# Patient Record
Sex: MALE | Race: ASIAN | NOT HISPANIC OR LATINO | URBAN - METROPOLITAN AREA
[De-identification: names, ages, dates, MRNs, and addresses within clinical notes are randomized per-mention and may not be internally consistent; named-entity substitution may affect disease eponyms.]

---
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
## 2023-01-01 NOTE — ED PROVIDER NOTE - MEDICAL DECISION MAKING DETAILS
19 y/o male c/o CP past week, no difficulty breathing or palpitations, reproducible CP. Plan EKG gave po Motrin. Dx costochondritis d/c home with instructions f/u w/ PMD or cardiology is CP persist
Vaginal Delivery

## 2023-11-01 ENCOUNTER — EMERGENCY (EMERGENCY)
Facility: HOSPITAL | Age: 23
LOS: 1 days | Discharge: HOME | End: 2023-11-01
Attending: EMERGENCY MEDICINE | Admitting: EMERGENCY MEDICINE
Payer: MEDICAID

## 2023-11-01 VITALS
HEART RATE: 90 BPM | OXYGEN SATURATION: 100 % | SYSTOLIC BLOOD PRESSURE: 153 MMHG | TEMPERATURE: 99 F | DIASTOLIC BLOOD PRESSURE: 101 MMHG | RESPIRATION RATE: 18 BRPM

## 2023-11-01 VITALS
TEMPERATURE: 99 F | DIASTOLIC BLOOD PRESSURE: 89 MMHG | RESPIRATION RATE: 18 BRPM | SYSTOLIC BLOOD PRESSURE: 130 MMHG | HEART RATE: 83 BPM | OXYGEN SATURATION: 99 %

## 2023-11-01 LAB
FLUAV AG NPH QL: SIGNIFICANT CHANGE UP
FLUAV AG NPH QL: SIGNIFICANT CHANGE UP
FLUBV AG NPH QL: SIGNIFICANT CHANGE UP
FLUBV AG NPH QL: SIGNIFICANT CHANGE UP
RSV RNA NPH QL NAA+NON-PROBE: SIGNIFICANT CHANGE UP
RSV RNA NPH QL NAA+NON-PROBE: SIGNIFICANT CHANGE UP
SARS-COV-2 RNA SPEC QL NAA+PROBE: SIGNIFICANT CHANGE UP
SARS-COV-2 RNA SPEC QL NAA+PROBE: SIGNIFICANT CHANGE UP

## 2023-11-01 PROCEDURE — 71046 X-RAY EXAM CHEST 2 VIEWS: CPT | Mod: 26

## 2023-11-01 PROCEDURE — 93010 ELECTROCARDIOGRAM REPORT: CPT

## 2023-11-01 PROCEDURE — 99284 EMERGENCY DEPT VISIT MOD MDM: CPT

## 2023-11-01 RX ORDER — IBUPROFEN 200 MG
600 TABLET ORAL ONCE
Refills: 0 | Status: COMPLETED | OUTPATIENT
Start: 2023-11-01 | End: 2023-11-01

## 2023-11-01 RX ADMIN — Medication 600 MILLIGRAM(S): at 21:02

## 2023-11-01 NOTE — ED PROVIDER NOTE - PATIENT PORTAL LINK FT
You can access the FollowMyHealth Patient Portal offered by [facility] by registering at the following website: [email or web address]. By joining [facility]’s FollowMyHealth portal, you will also be able to view your health information using other applications (apps) compatible with our system.

## 2023-11-01 NOTE — ED ADULT NURSE NOTE - NSFALLUNIVINTERV_ED_ALL_ED
Bed/Stretcher in lowest position, wheels locked, appropriate side rails in place/Call bell, personal items and telephone in reach/Instruct patient to call for assistance before getting out of bed/chair/stretcher/Non-slip footwear applied when patient is off stretcher/[place] to call system/Physically safe environment - no spills, clutter or unnecessary equipment/Purposeful proactive rounding/Room/bathroom lighting operational, light cord in reach

## 2023-11-01 NOTE — ED PROVIDER NOTE - PROGRESS NOTE DETAILS
Daniel Moreno, DO PGY-3: Chest x-ray normal, EKG normal, patient feeling better after ibuprofen.  Will discharge patient to follow-up with PMD.

## 2023-11-01 NOTE — ED ADULT NURSE NOTE - OBJECTIVE STATEMENT
as per triage rn, "pt complaining of chest pain that radiates down his R arm x 3 days. Pt denies pmhx. Pt hypertensive in triage. Pt denies sob, n/v/d, fever or chills."

## 2023-11-01 NOTE — ED PROVIDER NOTE - OBJECTIVE STATEMENT
23-year-old male no past medical history presents to ED complaining of 1 week of diffuse chest pain as well as bilateral trapezius pain that is worsened with movement of bilateral arms and position as well as coughing.  Patient's had a viral URI since beginning of symptoms.  Patient last took pain medication yesterday without relief.  Patient denies family history of early cardiac disease, blood clotting history, recent travel, fever, nausea, vomiting, diaphoresis, shortness of breath, abdominal pain, dysuria, diarrhea.  Patient is an active tobacco smoker.

## 2023-11-01 NOTE — ED PROVIDER NOTE - CLINICAL SUMMARY MEDICAL DECISION MAKING FREE TEXT BOX
Daniel Moreno,  PGY-3: 23-year-old male no past medical history presents to ED complaining of 1 week of diffuse chest pain as well as bilateral trapezius pain that is worsened with movement of bilateral arms and position as well as coughing.  Patient's had a viral URI since beginning of symptoms.  Patient last took pain medication yesterday without relief.  Patient denies family history of early cardiac disease, blood clotting history, recent travel, fever, nausea, vomiting, diaphoresis, shortness of breath, abdominal pain, dysuria, diarrhea.  Patient is an active tobacco smoker. Patient well-appearing, hemodynamically stable, EKG normal, on exam patient with bilateral anterior chest wall tenderness as well as bilateral trapezius tenderness, no rashes.  Differential includes not limited to costochondritis, doubt ACS, myocarditis/pericarditis, pneumothorax, pneumonia, PE as patient is PERC negative.  Plan for pain control, EKG, screening chest x-ray.  Reassess

## 2023-11-01 NOTE — ED PROVIDER NOTE - ATTENDING CONTRIBUTION TO CARE
Attending Statement: I have personally seen and examined this patient. I have fully participated in the care of this patient. I have reviewed all pertinent clinical information, including history physical exam, plan and the Resident's note and agree except as noted  23-year-old male denies any past medical history from home chief complaint of chest pain.  Patient states he has been sick for 1 week.  Endorsing a nonproductive cough.  Diffuse body aches.  Pain is described as anterior chest, both shoulders and both upper back worse with movement and worse with coughing.  No associated shortness of breath no dyspnea on exertion.  No recent fever or chills.  No recent travel.  Patient does not take any medications.  No history of blood clots or PE.  No nausea no vomiting no abdominal pain.  No leg swelling no calf tenderness.  Vital signs noted patient slightly hypertensive but not tachycardic not tachypneic not hypoxic.  Sitting up in bed ANO x3 well-appearing nontoxic.  Noted to have some nasal congestion not coughing.  Able to answer questions in full clear sentences.  No retractions not tachycardic not tachypneic reproducible tenderness on the anterior chest wall.  Pulse ox 100% on room air soft abdomen nontender.  No calf tenderness bilaterally.  Benign abdomen.  Plan EKG, RVP, chest x-ray PA and lateral, pain meds, repeat vitals specially blood pressure and reassess.  Given age and no significant cardiac risk factors would like that this is a cardiac event.  Patient has no significant risk factors for PE no recent travel  No hospitalization or immobilization no malignancy no hormone therapy.  Not tachycardic or tachypneic.  Patient is reproducible most likely costochondritis.  EKG is normal unchanged from prior. Attending Statement: I have personally seen and examined this patient. I have fully participated in the care of this patient. I have reviewed all pertinent clinical information, including history physical exam, plan and the Resident's note and agree except as noted  23-year-old male denies any past medical history from home chief complaint of chest pain.  Patient states he has been sick for 1 week.  Endorsing a nonproductive cough.  Diffuse body aches.  Pain is described as anterior chest, both shoulders and both upper back worse with movement and worse with coughing.  No associated shortness of breath no dyspnea on exertion.  No recent fever or chills.  No recent travel.  Patient does not take any medications.  No history of blood clots or PE.  no sig hx of family CAD  No nausea no vomiting no abdominal pain.  No leg swelling no calf tenderness.  Vital signs noted patient slightly hypertensive but not tachycardic not tachypneic not hypoxic.  Sitting up in bed ANO x3 well-appearing nontoxic.  Noted to have some nasal congestion not coughing.  Able to answer questions in full clear sentences.  No retractions not tachycardic not tachypneic reproducible tenderness on the anterior chest wall.  Pulse ox 100% on room air soft abdomen nontender.  No calf tenderness bilaterally.  Benign abdomen.  Plan EKG, RVP, chest x-ray PA and lateral, pain meds, repeat vitals specially blood pressure and reassess.  Given age and no significant cardiac risk factors would like that this is a cardiac event.  Patient has no significant risk factors for PE no recent travel  No hospitalization or immobilization no malignancy no hormone therapy.  Not tachycardic or tachypneic.  Patient is reproducible most likely costochondritis.  EKG is normal unchanged from prior.

## 2023-11-01 NOTE — ED PROVIDER NOTE - NSFOLLOWUPINSTRUCTIONS_ED_ALL_ED_FT
Please follow-up with your primary care doctor within the next week.    Please take ibuprofen every 6 hours as needed for pain.    Chest Pain    Chest pain can be caused by many different conditions which may or may not be dangerous. Causes include heartburn, lung infections, heart attack, blood clot in lungs, skin infections, strain or damage to muscle, cartilage, or bones, etc. In addition to a history and physical examination, an electrocardiogram (ECG) or other lab tests may have been performed to determine the cause of your chest pain. Follow up with your primary care provider or with a cardiologist as instructed.       SEEK IMMEDIATE MEDICAL CARE IF YOU HAVE ANY OF THE FOLLOWING SYMPTOMS: worsening chest pain, coughing up blood, unexplained back/neck/jaw pain, severe abdominal pain, dizziness or lightheadedness, fainting, shortness of breath, sweaty or clammy skin, vomiting, or racing heart beat. These symptoms may represent a serious problem that is an emergency. Do not wait to see if the symptoms will go away. Get medical help right away. Call 911 and do not drive yourself to the hospital.

## 2023-11-01 NOTE — ED PROVIDER NOTE - PHYSICAL EXAMINATION
GEN: Patient awake alert NAD.   HEENT: normocephalic, atraumatic, EOMI, no scleral icterus, moist MM  CARDIAC: RRR, S1, S2, no murmur. +anterior chest wall ttp  PULM: CTA B/L no wheeze, rhonchi, rales.   ABD: soft NT, ND, no rebound no guarding,   MSK: Moving all extremities, no edema. b/l trapezius ttp, pain worsened by UE movement.   NEURO: A&Ox3, gait normal, no focal neurological deficits  SKIN: warm, dry, no rash. no calf ttp

## 2023-11-01 NOTE — ED ADULT TRIAGE NOTE - CHIEF COMPLAINT QUOTE
pt complaining of chest pain that radiates down his R arm x 3 days. Pt denies pmhx. Pt hypertensive in triage. Pt denies sob, n/v/d, fever or chills.

## 2024-10-28 ENCOUNTER — EMERGENCY (EMERGENCY)
Facility: HOSPITAL | Age: 24
LOS: 1 days | Discharge: HOME | End: 2024-10-28
Attending: EMERGENCY MEDICINE | Admitting: EMERGENCY MEDICINE
Payer: MEDICAID

## 2024-10-28 VITALS
WEIGHT: 210.1 LBS | SYSTOLIC BLOOD PRESSURE: 169 MMHG | DIASTOLIC BLOOD PRESSURE: 95 MMHG | TEMPERATURE: 98 F | HEART RATE: 95 BPM | OXYGEN SATURATION: 99 % | RESPIRATION RATE: 18 BRPM

## 2024-10-28 PROCEDURE — 99285 EMERGENCY DEPT VISIT HI MDM: CPT

## 2024-10-28 PROCEDURE — 93010 ELECTROCARDIOGRAM REPORT: CPT

## 2024-10-28 RX ORDER — FAMOTIDINE 40 MG
20 TABLET ORAL ONCE
Refills: 0 | Status: COMPLETED | OUTPATIENT
Start: 2024-10-28 | End: 2024-10-28

## 2024-10-28 RX ORDER — MAG HYDROX/ALUMINUM HYD/SIMETH 200-200-20
30 SUSPENSION, ORAL (FINAL DOSE FORM) ORAL ONCE
Refills: 0 | Status: COMPLETED | OUTPATIENT
Start: 2024-10-28 | End: 2024-10-28

## 2024-10-28 RX ORDER — SODIUM CHLORIDE 0.9 % (FLUSH) 0.9 %
1000 SYRINGE (ML) INJECTION ONCE
Refills: 0 | Status: COMPLETED | OUTPATIENT
Start: 2024-10-28 | End: 2024-10-28

## 2024-10-28 RX ADMIN — Medication 2000 MILLILITER(S): at 22:49

## 2024-10-28 RX ADMIN — Medication 20 MILLIGRAM(S): at 22:50

## 2024-10-28 RX ADMIN — Medication 30 MILLILITER(S): at 22:50

## 2024-10-28 NOTE — ED PROVIDER NOTE - PROGRESS NOTE DETAILS
Diogo HERRERA: Pt signed out to me.  Labs and imaging results reviewed and there are no acutely concerning findings.  Blood glucose noted to be high - pt states he was diagnosed with diabetes and prescribed medication, but he has never taken it.  Normal AG, not in DKA.  Pt is feeling better, tolerating PO and requesting to go home.  Will give outpatient PMD referral for follow-up and chronic management of uncontrolled DM.

## 2024-10-28 NOTE — ED ADULT TRIAGE NOTE - CHIEF COMPLAINT QUOTE
Pt c/o chest pain and vomiting this afternoon. States vomit was all blood.  No sob, abd pain, diarrhea, fevers/chills. Denies Hx

## 2024-10-28 NOTE — ED PROVIDER NOTE - NSFOLLOWUPINSTRUCTIONS_ED_ALL_ED_FT
You were seen in the emergency department for nausea and vomiting.  You had blood work and a chest x-ray done, which did not reveal any immediately concerning findings.  Your blood sugar was noted to be high.  Please take your diabetes medications as prescribed.  Follow-up with a primary care doctor for further management of your diabetes.  Our discharge center will contact you in 2-3 business days to assist you in scheduling an appointment with a primary care doctor.    Drink plenty of fluids.  You can take ibuprofen 600mg every 6 hours or Tylenol 650mg every 4 hours as needed for pain or fever.  Follow-up with your PMD in 1-2 weeks.  Return to the emergency department for any new or worsening symptoms.

## 2024-10-28 NOTE — ED PROVIDER NOTE - OBJECTIVE STATEMENT
25 yo M no significant past medical history who presents with vomiting.  Patient states he awoke this morning feeling nauseated and experienced multiple episodes of vomiting.  Initial episodes were gastric contents but progressed to 2-3 episodes of hematemesis, which resolved however patient is still experienced chest burning.  Denies recent travel or sick contacts.  Occasional etoh and marijuana user.  No history of abdominal surgeries.  No NSAID, anticoagulation, or anti-platelet use.  Denies fever, chills, sob, syncope, bloody or dark stool, abd pain.

## 2024-10-28 NOTE — ED PROVIDER NOTE - PHYSICAL EXAMINATION
GEN:  Non-toxic appearing, non-distressed, speaking full sentences, non-diaphoretic, AAOx3  HEENT:  NCAT, neck supple, EOMI, PERRLA, sclera anicteric, no conjunctival pallor or injection, no stridor, normal voice, no tonsillar exudate, uvula midline  CV:  regular rhythm and rate, s1/s2 audible, no murmurs, rubs or gallops, peripheral pulses 2+ and symmetric  PULM:  non-labored respirations, lungs clear to auscultation bilaterally, no wheezes, crackles or rales  ABD:  non distended, non-tender, no rebound, no guarding, negative Correa's sign, bowel sounds normal, no cvat  MSK:  no gross deformity, non-tender extremities and joints, range of motion grossly normal appearing, no extremity edema, extremities warm and well perfused   NEURO:  AAOx3, CN II-XII intact, motor 5/5 in upper and lower extremities bilaterally, sensation grossly intact in extremities and trunk, no gait deficit  SKIN:  warm, dry, no rash or vesicles

## 2024-10-28 NOTE — ED PROVIDER NOTE - CLINICAL SUMMARY MEDICAL DECISION MAKING FREE TEXT BOX
23 yo M no significant past medical history who presents with vomiting.  VSS AF.  No active emesis or hematemesis in ED.  Abdomen non-tender.  Possible pud, joshua bradley tear, esophagitis.  Plan for labs, cxr, supportive care.  Disposition pending.  EKG non-ischemic.

## 2024-10-28 NOTE — ED ADULT NURSE NOTE - OBJECTIVE STATEMENT
Pt received to intake room 13, A&Ox4, ambulatory. Pt presenting to the ED today complaining of vomiting that began around 5 pm today. Pt states he had 2 episodes of vomiting that was bright red blood. Pt denies c/p, SOB, headache, blurry vision, dizziness, palpitations, nausea, diarrhea, abd pain, or fever like symptoms. Respirations even and unlabored. NAD noted. 20G IV placed in the right AC. Labs drawn and sent. Pt medicated as per MD orders. Comfort measures provided, safety maintained. Plan of care ongoing. Pt pending results of lab work.

## 2024-10-29 VITALS
OXYGEN SATURATION: 100 % | SYSTOLIC BLOOD PRESSURE: 130 MMHG | HEART RATE: 80 BPM | TEMPERATURE: 98 F | DIASTOLIC BLOOD PRESSURE: 84 MMHG | RESPIRATION RATE: 17 BRPM

## 2024-10-29 LAB
ALBUMIN SERPL ELPH-MCNC: 4.5 G/DL — SIGNIFICANT CHANGE UP (ref 3.3–5)
ALP SERPL-CCNC: 60 U/L — SIGNIFICANT CHANGE UP (ref 40–120)
ALT FLD-CCNC: 45 U/L — HIGH (ref 4–41)
ANION GAP SERPL CALC-SCNC: 14 MMOL/L — SIGNIFICANT CHANGE UP (ref 7–14)
AST SERPL-CCNC: 24 U/L — SIGNIFICANT CHANGE UP (ref 4–40)
BASOPHILS # BLD AUTO: 0.07 K/UL — SIGNIFICANT CHANGE UP (ref 0–0.2)
BASOPHILS NFR BLD AUTO: 0.7 % — SIGNIFICANT CHANGE UP (ref 0–2)
BILIRUB SERPL-MCNC: 0.3 MG/DL — SIGNIFICANT CHANGE UP (ref 0.2–1.2)
BUN SERPL-MCNC: 16 MG/DL — SIGNIFICANT CHANGE UP (ref 7–23)
CALCIUM SERPL-MCNC: 9.3 MG/DL — SIGNIFICANT CHANGE UP (ref 8.4–10.5)
CHLORIDE SERPL-SCNC: 97 MMOL/L — LOW (ref 98–107)
CO2 SERPL-SCNC: 23 MMOL/L — SIGNIFICANT CHANGE UP (ref 22–31)
CREAT SERPL-MCNC: 0.62 MG/DL — SIGNIFICANT CHANGE UP (ref 0.5–1.3)
EGFR: 138 ML/MIN/1.73M2 — SIGNIFICANT CHANGE UP
EOSINOPHIL # BLD AUTO: 0.15 K/UL — SIGNIFICANT CHANGE UP (ref 0–0.5)
EOSINOPHIL NFR BLD AUTO: 1.5 % — SIGNIFICANT CHANGE UP (ref 0–6)
GLUCOSE SERPL-MCNC: 256 MG/DL — HIGH (ref 70–99)
HCT VFR BLD CALC: 36.8 % — LOW (ref 39–50)
HGB BLD-MCNC: 12.8 G/DL — LOW (ref 13–17)
IANC: 5.44 K/UL — SIGNIFICANT CHANGE UP (ref 1.8–7.4)
IMM GRANULOCYTES NFR BLD AUTO: 0.3 % — SIGNIFICANT CHANGE UP (ref 0–0.9)
LIDOCAIN IGE QN: 26 U/L — SIGNIFICANT CHANGE UP (ref 7–60)
LYMPHOCYTES # BLD AUTO: 3.6 K/UL — HIGH (ref 1–3.3)
LYMPHOCYTES # BLD AUTO: 36.4 % — SIGNIFICANT CHANGE UP (ref 13–44)
MCHC RBC-ENTMCNC: 31.8 PG — SIGNIFICANT CHANGE UP (ref 27–34)
MCHC RBC-ENTMCNC: 34.8 GM/DL — SIGNIFICANT CHANGE UP (ref 32–36)
MCV RBC AUTO: 91.3 FL — SIGNIFICANT CHANGE UP (ref 80–100)
MONOCYTES # BLD AUTO: 0.61 K/UL — SIGNIFICANT CHANGE UP (ref 0–0.9)
MONOCYTES NFR BLD AUTO: 6.2 % — SIGNIFICANT CHANGE UP (ref 2–14)
NEUTROPHILS # BLD AUTO: 5.44 K/UL — SIGNIFICANT CHANGE UP (ref 1.8–7.4)
NEUTROPHILS NFR BLD AUTO: 54.9 % — SIGNIFICANT CHANGE UP (ref 43–77)
NRBC # BLD: 0 /100 WBCS — SIGNIFICANT CHANGE UP (ref 0–0)
NRBC # FLD: 0 K/UL — SIGNIFICANT CHANGE UP (ref 0–0)
PLATELET # BLD AUTO: 250 K/UL — SIGNIFICANT CHANGE UP (ref 150–400)
POTASSIUM SERPL-MCNC: 3.9 MMOL/L — SIGNIFICANT CHANGE UP (ref 3.5–5.3)
POTASSIUM SERPL-SCNC: 3.9 MMOL/L — SIGNIFICANT CHANGE UP (ref 3.5–5.3)
PROT SERPL-MCNC: 7.6 G/DL — SIGNIFICANT CHANGE UP (ref 6–8.3)
RBC # BLD: 4.03 M/UL — LOW (ref 4.2–5.8)
RBC # FLD: 11.3 % — SIGNIFICANT CHANGE UP (ref 10.3–14.5)
SODIUM SERPL-SCNC: 134 MMOL/L — LOW (ref 135–145)
TROPONIN T, HIGH SENSITIVITY RESULT: 6 NG/L — SIGNIFICANT CHANGE UP
WBC # BLD: 9.9 K/UL — SIGNIFICANT CHANGE UP (ref 3.8–10.5)
WBC # FLD AUTO: 9.9 K/UL — SIGNIFICANT CHANGE UP (ref 3.8–10.5)

## 2024-10-29 PROCEDURE — 71046 X-RAY EXAM CHEST 2 VIEWS: CPT | Mod: 26

## 2024-10-29 NOTE — ED ADULT NURSE REASSESSMENT NOTE - NS ED NURSE REASSESS COMMENT FT1
Pt reports improvement in condition. Respirations even and unlabored. No signs of distress noted. VSS. IV removed. Pt stable for discharge.